# Patient Record
Sex: FEMALE
[De-identification: names, ages, dates, MRNs, and addresses within clinical notes are randomized per-mention and may not be internally consistent; named-entity substitution may affect disease eponyms.]

---

## 2018-04-20 ENCOUNTER — HOSPITAL ENCOUNTER (EMERGENCY)
Dept: HOSPITAL 97 - ER | Age: 53
Discharge: HOME | End: 2018-04-20
Payer: COMMERCIAL

## 2018-04-20 DIAGNOSIS — R06.00: Primary | ICD-10-CM

## 2018-04-20 DIAGNOSIS — Z88.1: ICD-10-CM

## 2018-04-20 LAB
ALBUMIN SERPL BCP-MCNC: 4.5 G/DL (ref 3.2–5.5)
ALP SERPL-CCNC: 79 IU/L (ref 42–121)
ALT SERPL W P-5'-P-CCNC: 40 IU/L (ref 10–60)
AST SERPL W P-5'-P-CCNC: 32 IU/L (ref 10–42)
BUN BLD-MCNC: 10 MG/DL (ref 6–20)
GLUCOSE SERPLBLD-MCNC: 88 MG/DL (ref 65–120)
HCT VFR BLD CALC: 41.6 % (ref 36–45)
INR BLD: 0.92
LYMPHOCYTES # SPEC AUTO: 1.6 K/UL (ref 0.7–4.9)
MAGNESIUM SERPL-MCNC: 1.9 MG/DL (ref 1.8–2.5)
MCH RBC QN AUTO: 29.7 PG (ref 27–35)
MCV RBC: 86 FL (ref 80–100)
PMV BLD: 7.7 FL (ref 7.6–11.3)
POTASSIUM SERPL-SCNC: 3.8 MEQ/L (ref 3.6–5)
RBC # BLD: 4.84 M/UL (ref 3.86–4.86)

## 2018-04-20 PROCEDURE — 71045 X-RAY EXAM CHEST 1 VIEW: CPT

## 2018-04-20 PROCEDURE — 99284 EMERGENCY DEPT VISIT MOD MDM: CPT

## 2018-04-20 PROCEDURE — 80048 BASIC METABOLIC PNL TOTAL CA: CPT

## 2018-04-20 PROCEDURE — 36415 COLL VENOUS BLD VENIPUNCTURE: CPT

## 2018-04-20 PROCEDURE — 80076 HEPATIC FUNCTION PANEL: CPT

## 2018-04-20 PROCEDURE — 85025 COMPLETE CBC W/AUTO DIFF WBC: CPT

## 2018-04-20 PROCEDURE — 83735 ASSAY OF MAGNESIUM: CPT

## 2018-04-20 PROCEDURE — 85610 PROTHROMBIN TIME: CPT

## 2018-04-20 PROCEDURE — 84484 ASSAY OF TROPONIN QUANT: CPT

## 2018-04-20 PROCEDURE — 83880 ASSAY OF NATRIURETIC PEPTIDE: CPT

## 2018-04-20 PROCEDURE — 93005 ELECTROCARDIOGRAM TRACING: CPT

## 2018-04-20 PROCEDURE — 85379 FIBRIN DEGRADATION QUANT: CPT

## 2018-04-20 NOTE — ER
Nurse's Notes                                                                                     

 [facility]                                                                

Name: Sid Coffey                                                                                

Age: 53 yrs                                                                                       

Sex: Female                                                                                       

: 1965                                                                                   

MRN: [id number]                                                                                   

Arrival Date: 2018                                                                          

Time: 09:04                                                                                       

Account#: [id number]                                                                            

Bed 4                                                                                             

Private MD:                                                                                       

Diagnosis: Dyspnea                                                                                

                                                                                                  

Presentation:                                                                                     

                                                                                             

09:24 Presenting complaint: Patient states: SOB at rest that started at 2 am this morning, pt sg  

      reports using her inhaler at home but it has not helped. pt denies CP, Dizziness,           

      Palpitations, denies N/V/D, denies fever. Transition of care: patient was not received      

      from another setting of care. Onset of symptoms was 2018. Initial Sepsis          

      Screen: Does the patient meet any 2 criteria? No. Patient's initial sepsis screen is        

      negative. Does the patient have a suspected source of infection? No. Patient's initial      

      sepsis screen is negative. Care prior to arrival: None.                                     

09:24 Method Of Arrival: Ambulatory                                                           sg  

09:24 Acuity: ZULEIKA 4                                                                           sg  

                                                                                                  

Triage Assessment:                                                                                

09:40 General: Appears in no apparent distress. comfortable, well groomed, well developed,    sg  

      well nourished, Behavior is calm, cooperative, appropriate for age. Respiratory: the        

      patient has mild shortness of breath.                                                       

                                                                                                  

Historical:                                                                                       

- Allergies:                                                                                      

09:24 Zofran;                                                                                 sg  

- PMHx:                                                                                           

09:24 Asthma; Hypertension;                                                                   sg  

- PSHx:                                                                                           

09:24 ;                                                                              sg  

                                                                                                  

- Immunization history:: Adult Immunizations unknown, Flu vaccine is not up to date.              

- Social history:: Smoking status: Patient/guardian denies using tobacco.                         

                                                                                                  

                                                                                                  

Screenin:35 Abuse screen: Denies threats or abuse. Denies injuries from another. Nutritional        sg  

      screening: No deficits noted. Tuberculosis screening: No symptoms or risk factors           

      identified. Never had TB. Fall Risk None identified.                                        

                                                                                                  

Assessment:                                                                                       

09:35 General: Appears in no apparent distress. comfortable, well groomed, well developed,    sg  

      well nourished, Behavior is calm, cooperative, appropriate for age. Pain: Denies pain.      

      Neuro: Level of Consciousness is awake, alert, obeys commands, Oriented to person,          

      place, time,  are equal bilaterally Moves all extremities. Full function Gait is       

      steady, Speech is normal, Facial symmetry appears normal. Cardiovascular: Heart tones       

      S1 S2 present Capillary refill is brisk in bilateral fingers Patient's skin is warm and     

      dry. Chest pain is denied. Respiratory: Reports shortness of breath at rest since 0200      

      this morning Airway is patent Respiratory effort is even, unlabored, Respiratory            

      pattern is regular, symmetrical, Breath sounds are clear. GI: Abdomen is round              

      non-distended. GI: No signs and/or symptoms were reported involving the                     

      gastrointestinal system. : No signs and/or symptoms were reported regarding the           

      genitourinary system. EENT: No signs and/or symptoms were reported regarding the EENT       

      system. Derm: Skin is intact, is healthy with good turgor, Skin is dry, Skin is normal,     

      Skin temperature is warm. Musculoskeletal: No signs and/or symptoms reported regarding      

      the musculoskeletal system.                                                                 

                                                                                                  

Vital Signs:                                                                                      

09:27  / 102; Pulse 78; Resp 20 S; Temp 98.0; Pulse Ox 100% on R/A; Weight 74.84 kg     sg  

      (R); Pain 0/10;                                                                             

09:34  / 84;                                                                            sg  

12:20  / 72; Pulse 77; Resp 17 S; Temp 98.0; Pulse Ox 100% on R/A; Pain 0/10;           sg  

09:27 pain with deep breathing                                                                sg  

                                                                                                  

ED Course:                                                                                        

09:04 Patient arrived in ED.                                                                  sb2 

09:18 Hema Kumari PA is PHCP.                                                               [id number] 

09:18 Faisal Avelar MD is Attending Physician.                                              [id number] 

09:23 Jeremy Fabian, RN is Primary Nurse.                                                       sg  

09:27 Triage completed.                                                                       sg  

09:28 Arm band placed on.                                                                     sg  

09:37 EKG done, by EKG tech. reviewed by Hema ANG.                                      at1 

09:40 Patient has correct armband on for positive identification. Bed in low position. Call   sg  

      light in reach. Pulse ox on. NIBP on. Warm blanket given. Head of bed elevated.             

11:46 X-ray completed. Portable x-ray completed in exam room. Patient tolerated procedure     ml  

      well.                                                                                       

11:49 XRAY Chest (1 view) In Process Unspecified.                                             EDMS

12:30 No provider procedures requiring assistance completed. IV discontinued, intact,         sg  

      bleeding controlled, No redness/swelling at site. Pressure dressing applied.                

                                                                                                  

Administered Medications:                                                                         

No medications were administered                                                                  

                                                                                                  

                                                                                                  

Outcome:                                                                                          

12:08 Discharge ordered by MD.                                                                [id number] 

12:30 Discharged to home ambulatory, with family.                                             sg  

12:30 Condition: good                                                                             

12:30 Discharge instructions given to patient, Instructed on discharge instructions, follow       

      up and referral plans. medication usage, safety practices, Demonstrated understanding       

      of instructions, follow-up care, medications, Prescriptions given X 3.                      

12:36 Patient left the ED.                                                                    sg  

                                                                                                  

Signatures:                                                                                       

Dispatcher [employer]                           EDJeremy Hamilton RN                         RN   Helen Hernandez Josh, PA PA   [id number]                                                  

Shahida moeller, EKG Tech              EKG Tat1                                                  

Magalie Olson                               sb2                                                  

                                                                                                  

**************************************************************************************************

## 2018-04-20 NOTE — EDPHYS
Physician Documentation                                                                           

 [facility]                                                                

Name: Sid Coffey                                                                                

Age: 53 yrs                                                                                       

Sex: Female                                                                                       

: 1965                                                                                   

MRN: [id number]                                                                                   

Arrival Date: 2018                                                                          

Time: 09:04                                                                                       

Account#: [id number]                                                                            

Bed 4                                                                                             

Private MD:                                                                                       

ED Physician Faisal Avelar                                                                       

HPI:                                                                                              

                                                                                             

12:07 This 53 yrs old  Female presents to ER via Ambulatory with complaints of        jr8 

      Shortness Of Breath.                                                                        

12:07 The patient has shortness of breath at rest. Onset: The symptoms/episode began/occurred jr8 

      acutely, today. Duration: The symptoms are continuous, and are unchanged since they         

      started. The patient's shortness of breath is aggravated by exertion. Associated signs      

      and symptoms: The patient has no apparent associated signs or symptoms. Severity of         

      symptoms: At their worst the symptoms were mild in the emergency department the             

      symptoms are unchanged. It is unknown whether or not the patient has had similar            

      symptoms in the past. The patient has not recently seen a physician.                        

                                                                                                  

Historical:                                                                                       

- Allergies:                                                                                      

09:24 Zofran;                                                                                 sg  

- PMHx:                                                                                           

09:24 Asthma; Hypertension;                                                                   sg  

- PSHx:                                                                                           

09:24 ;                                                                              sg  

                                                                                                  

- Immunization history:: Adult Immunizations unknown, Flu vaccine is not up to date.              

- Social history:: Smoking status: Patient/guardian denies using tobacco.                         

                                                                                                  

                                                                                                  

ROS:                                                                                              

12:07 Eyes: Negative for injury, pain, redness, and discharge, ENT: Negative for injury,      jr8 

      pain, and discharge, Neck: Negative for injury, pain, and swelling, Cardiovascular:         

      Negative for chest pain, palpitations, and edema, Abdomen/GI: Negative for abdominal        

      pain, nausea, vomiting, diarrhea, and constipation, Back: Negative for injury and pain,     

      MS/Extremity: Negative for injury and deformity, Skin: Negative for injury, rash, and       

      discoloration, Neuro: Negative for headache, weakness, numbness, tingling, and seizure.     

12:07 Respiratory: Positive for dyspnea on exertion, shortness of breath, Negative for cough,     

      hemoptysis, orthopnea, pleurisy, wheezing.                                                  

                                                                                                  

Exam:                                                                                             

12:07 Eyes:  Pupils equal round and reactive to light, extra-ocular motions intact.  Lids and jr8 

      lashes normal.  Conjunctiva and sclera are non-icteric and not injected.  Cornea within     

      normal limits.  Periorbital areas with no swelling, redness, or edema. ENT:  Nares          

      patent. No nasal discharge, no septal abnormalities noted.  Tympanic membranes are          

      normal and external auditory canals are clear.  Oropharynx with no redness, swelling,       

      or masses, exudates, or evidence of obstruction, uvula midline.  Mucous membranes           

      moist. Neck:  Trachea midline, no thyromegaly or masses palpated, and no cervical           

      lymphadenopathy.  Supple, full range of motion without nuchal rigidity, or vertebral        

      point tenderness.  No Meningismus. Cardiovascular:  Regular rate and rhythm with a          

      normal S1 and S2.  No gallops, murmurs, or rubs.  Normal PMI, no JVD.  No pulse             

      deficits. Respiratory:  Lungs have equal breath sounds bilaterally, clear to                

      auscultation and percussion.  No rales, rhonchi or wheezes noted.  No increased work of     

      breathing, no retractions or nasal flaring. Abdomen/GI:  Soft, non-tender, with normal      

      bowel sounds.  No distension or tympany.  No guarding or rebound.  No evidence of           

      tenderness throughout. Back:  No spinal tenderness.  No costovertebral tenderness.          

      Full range of motion. Skin:  Warm, dry with normal turgor.  Normal color with no            

      rashes, no lesions, and no evidence of cellulitis. MS/ Extremity:  Pulses equal, no         

      cyanosis.  Neurovascular intact.  Full, normal range of motion. Neuro:  Awake and           

      alert, GCS 15, oriented to person, place, time, and situation.  Cranial nerves II-XII       

      grossly intact.  Motor strength 5/5 in all extremities.  Sensory grossly intact.            

      Cerebellar exam normal.  Normal gait.                                                       

                                                                                                  

Vital Signs:                                                                                      

09:27  / 102; Pulse 78; Resp 20 S; Temp 98.0; Pulse Ox 100% on R/A; Weight 74.84 kg     sg  

      (R); Pain 0/10;                                                                             

09:34  / 84;                                                                            sg  

12:20  / 72; Pulse 77; Resp 17 S; Temp 98.0; Pulse Ox 100% on R/A; Pain 0/10;           sg  

09:27 pain with deep breathing                                                                sg  

                                                                                                  

MDM:                                                                                              

09:18 Patient medically screened.                                                             jr8 

12:07 Differential diagnosis: Anxiety Reaction asthma, Bronchitis CHF exacerbation, Chronic   jr8 

      Obstructive Pulmonary Disease Myocardial Infarction pneumonia, pulmonary edema,             

      Pulmonary Embolism Sepsis Unstable Angina. Data reviewed: vital signs, nurses notes,        

      lab test result(s), EKG, radiologic studies, plain films, and as a result, I will           

      discharge patient. Data interpreted: Pulse oximetry: on room air is 100 %.                  

      Interpretation: normal. Counseling: I had a detailed discussion with the patient and/or     

      guardian regarding: the historical points, exam findings, and any diagnostic results        

      supporting the discharge/admit diagnosis, lab results, radiology results, the need for      

      outpatient follow up, a family practitioner, to return to the emergency department if       

      symptoms worsen or persist or if there are any questions or concerns that arise at home.    

                                                                                                  

                                                                                             

10:23 Order name: Basic Metabolic Panel; Complete Time: 11:                                                                                             

10:23 Order name: BNP; Complete Time: :                                                                                             

10:23 Order name: CBC with Diff; Complete Time: 11:                                                                                             

10:23 Order name: LFT's; Complete Time: :                                                                                             

10:23 Order name: Magnesium; Complete Time: :                                                                                             

10:23 Order name: PT-INR; Complete Time: 11:                                                                                             

10:23 Order name: Troponin (emerg Dept Use Only); Complete Time: :                                                                                             

10:23 Order name: XRAY Chest (1 view); Complete Time: 12:07                                                                                                                                

10:23 Order name: Cardiac monitoring; Complete Time: 11:                                                                                             

10:23 Order name: EKG - Nurse/Tech; Complete Time: 11:                                                                                             

10:23 Order name: IV Saline Lock; Complete Time: 11:                                                                                             

10:23 Order name: DD; Complete Time: 11:                                                                                             

11:56 Order name: EKG Electrocardiogram                                                       EDMS

                                                                                             

10:23 Order name: Labs collected and sent; Complete Time: :                                                                                             

10:23 Order name: O2 Per Protocol; Complete Time: :                                                                                             

10:23 Order name: O2 Sat Monitoring; Complete Time: : 

                                                                                                  

Administered Medications:                                                                         

No medications were administered                                                                  

                                                                                                  

                                                                                                  

Disposition:                                                                                      

18 12:08 Discharged to Home. Impression: Dyspnea.                                           

- Condition is Stable.                                                                            

- Discharge Instructions: Panic Attacks, Shortness of Breath.                                     

- Prescriptions for Hydroxyzine HCl 50 mg Oral Tablet - take 1 tablet by ORAL route               

  every 8 hours As needed; 21 tablet.                                                             

- Work release form, Medication Reconciliation Form, Thank You Letter, Antibiotic                 

  Education, Prescription Opioid Use form.                                                        

- Follow up: Private Physician; When: 2 - 3 days; Reason: Recheck today's complaints,             

  Continuance of care, Re-evaluation by your physician.                                           

- Problem is new.                                                                                 

- Symptoms have improved.                                                                         

                                                                                                  

                                                                                                  

                                                                                                  

Addendum:                                                                                         

2018                                                                                        

     19:43 Co-signature as Attending Physician, Faisal Avelar MD I agree with the assessment and   k
dr

           plan of care.                                                                          

                                                                                                  

Signatures:                                                                                       

Dispatcher [employer]                           EDMS                                                 

Jeremy Fabian RN                         RN   sg                                                   

Faisal Avelar MD MD   [facility]                                                  

Hema Kumari PA                        PA   jr8                                                  

                                                                                                  

Corrections: (The following items were deleted from the chart)                                    

                                                                                             

12:36 12:08 2018 12:08 Discharged to Home. Impression: Dyspnea. Condition is Stable.    sg  

      Forms are Medication Reconciliation Form, Thank You Letter, Antibiotic Education,           

      Prescription Opioid Use. Follow up: Private Physician; When: 2 - 3 days; Reason:            

      Recheck today's complaints, Continuance of care, Re-evaluation by your physician.           

      Problem is new. Symptoms have improved. jr8                                                 

                                                                                                  

**************************************************************************************************

## 2018-04-20 NOTE — EKG
Test Date:    2018-04-20               Test Time:    09:24:28

Technician:   RAJENDRA                                     

                                                     

MEASUREMENT RESULTS:                                       

Intervals:                                           

Rate:         75                                     

LA:           154                                    

QRSD:         76                                     

QT:           388                                    

QTc:          433                                    

Axis:                                                

P:            62                                     

LA:           154                                    

QRS:          57                                     

T:            9                                      

                                                     

INTERPRETIVE STATEMENTS:                                       

                                                     

Normal sinus rhythm

Low voltage QRS

Borderline ECG

Compared to ECG 08/18/2017 07:52:58

Low QRS voltage now present

Myocardial infarct finding no longer present



Electronically Signed On 04-20-18 12:55:18 CDT by Benjamin Chacko

## 2018-04-20 NOTE — RAD REPORT
EXAM DESCRIPTION:  RAD - Chest Single View - 4/20/2018 11:55 am

 

CLINICAL HISTORY:  Shortness of breath.

 

COMPARISON:  None.

 

FINDINGS:  Portable technique limits examination quality.

 

The lungs are grossly clear. The heart is normal in size. No displaced fractures.

 

IMPRESSION:  No acute intrathoracic process suspected.

## 2018-11-03 ENCOUNTER — HOSPITAL ENCOUNTER (EMERGENCY)
Dept: HOSPITAL 97 - ER | Age: 53
Discharge: HOME | End: 2018-11-03
Payer: COMMERCIAL

## 2018-11-03 DIAGNOSIS — Z88.8: ICD-10-CM

## 2018-11-03 DIAGNOSIS — R51: Primary | ICD-10-CM

## 2018-11-03 DIAGNOSIS — I10: ICD-10-CM

## 2018-11-03 LAB
BUN BLD-MCNC: 12 MG/DL (ref 7–18)
GLUCOSE SERPLBLD-MCNC: 114 MG/DL (ref 74–106)
HCT VFR BLD CALC: 41.8 % (ref 36–45)
LYMPHOCYTES # SPEC AUTO: 1.1 K/UL (ref 0.7–4.9)
MCH RBC QN AUTO: 30.8 PG (ref 27–35)
MCV RBC: 88.1 FL (ref 80–100)
PMV BLD: 7.8 FL (ref 7.6–11.3)
POTASSIUM SERPL-SCNC: 3.9 MMOL/L (ref 3.5–5.1)
RBC # BLD: 4.74 M/UL (ref 3.86–4.86)

## 2018-11-03 PROCEDURE — 85025 COMPLETE CBC W/AUTO DIFF WBC: CPT

## 2018-11-03 PROCEDURE — 36415 COLL VENOUS BLD VENIPUNCTURE: CPT

## 2018-11-03 PROCEDURE — 81003 URINALYSIS AUTO W/O SCOPE: CPT

## 2018-11-03 PROCEDURE — 80048 BASIC METABOLIC PNL TOTAL CA: CPT

## 2018-11-03 PROCEDURE — 99284 EMERGENCY DEPT VISIT MOD MDM: CPT

## 2018-11-03 PROCEDURE — 81025 URINE PREGNANCY TEST: CPT

## 2018-11-03 NOTE — ER
Nurse's Notes                                                                                     

 [facility]                                                                

Name: Sid Coffey                                                                                

Age: 53 yrs                                                                                       

Sex: Female                                                                                       

: 1965                                                                                   

MRN: [id number]                                                                                   

Arrival Date: 2018                                                                          

Time: 12:19                                                                                       

Account#: [id number]                                                                            

Bed 4                                                                                             

Private MD:                                                                                       

Diagnosis: Headache                                                                               

                                                                                                  

Presentation:                                                                                     

                                                                                             

12:24 Presenting complaint: Patient states: Headache since 0300 with vomiting, similar to     la1 

      previous, cannot keep blood pressure medication down. Transition of care: patient was       

      not received from another setting of care. Onset of symptoms was 2018.         

      Risk Assessment: Do you want to hurt yourself or someone else? Patient reports no           

      desire to harm self or others. Initial Sepsis Screen: Does the patient meet any 2           

      criteria? No. Patient's initial sepsis screen is negative. Does the patient have a          

      suspected source of infection? No. Patient's initial sepsis screen is negative. Care        

      prior to arrival: None.                                                                     

12:24 Method Of Arrival: Ambulatory                                                           la1 

12:24 Acuity: ZULEIKA 3                                                                           la1 

                                                                                                  

Triage Assessment:                                                                                

12:30 Headache History: The patient has had previous headaches and this one is similar to     tw2 

      previous episodes.                                                                          

12:39 General: Appears in no apparent distress. Pain: Pain currently is 10 out of 10 on a     tw2 

      pain scale. Pain began suddenly, "at 3 am, I tried advil and i wasn't able to keep it       

      down" Also complains of nausea.                                                             

                                                                                                  

OB/GYN:                                                                                           

13:30 LMP N/A - Post-menopause                                                                tw2 

                                                                                                  

Historical:                                                                                       

- Allergies:                                                                                      

12:26 Zofran;                                                                                 la1 

- PMHx:                                                                                           

12:26 Asthma; Hypertension;                                                                   la1 

                                                                                                  

- Immunization history:: Adult Immunizations up to date.                                          

- Social history:: Smoking status: Patient/guardian denies using tobacco.                         

- Ebola Screening: : No symptoms or risks identified at this time.                                

                                                                                                  

                                                                                                  

Screenin:38 Abuse screen: Denies threats or abuse. Nutritional screening: No deficits noted.        tw2 

      Tuberculosis screening: No symptoms or risk factors identified. Fall Risk None              

      identified.                                                                                 

                                                                                                  

Assessment:                                                                                       

12:37 General: Appears in no apparent distress. Behavior is calm, cooperative, appropriate    tw2 

      for age. Pain: Complains of pain in Headache. Neuro: Level of Consciousness is awake,       

      alert, obeys commands, Oriented to person, place, time, situation. Cardiovascular:          

      Denies chest pain, shortness of breath, Heart tones S1 S2 Capillary refill < 3 seconds      

      Patient's skin is warm and dry. Respiratory: Airway is patent Respiratory effort is         

      even, unlabored, Respiratory pattern is regular, symmetrical. GI: Abdomen is flat,          

      Bowel sounds present X 4 quads. Reports nausea, Patient currently denies abdominal          

      pain. : No signs and/or symptoms were reported regarding the genitourinary system.        

      EENT: No signs and/or symptoms were reported regarding the EENT system. Derm: No signs      

      and/or symptoms reported regarding the dermatologic system. Musculoskeletal: Range of       

      motion: intact in all extremities.                                                          

13:05 Reassessment: Patient and/or family updated on plan of care and expected duration. Pain tw2 

      level reassessed. pt reports feeling anxious "and agitated and my legs are jumping",        

      provider SAV Cabrera notified and at bedside at this time Patient states symptoms have      

      not improved.                                                                               

13:25 Reassessment: Patient appears in no apparent distress at this time. Patient and/or      tw2 

      family updated on plan of care and expected duration. Pain level reassessed. Patient        

      states feeling better.                                                                      

14:33 Reassessment: Patient appears in no apparent distress at this time. Patient and/or      tw2 

      family updated on plan of care and expected duration. Pain level reassessed. Patient        

      states feeling better. Patient states symptoms have improved.                               

15:34 Reassessment: Patient appears in no apparent distress at this time. Patient and/or      tw2 

      family updated on plan of care and expected duration. Pain level reassessed.                

                                                                                                  

Vital Signs:                                                                                      

12:25  / 93; Pulse 86; Resp 16; Temp 97.6; Pulse Ox 98% on R/A; Weight 75.75 kg; Height la1 

      5 ft. 7 in. (170.18 cm);                                                                    

13:30  / 47; Pulse 75; Resp 17; Pulse Ox 97% on R/A; Pain 7/10;                         tw2 

14:33  / 66; Pulse 78; Resp 17; Pulse Ox 97% on R/A;                                    tw2 

15:33 BP 92 / 65; Pulse 53; Resp 17; Pulse Ox 97% on R/A; Pain 4/10;                          tw2 

12:25 Body Mass Index 26.16 (75.75 kg, 170.18 cm)                                             la1 

                                                                                                  

ED Course:                                                                                        

12:19 Patient arrived in ED.                                                                  as  

12:25 Triage completed.                                                                       la1 

12:26 Arm band placed on right wrist.                                                         la1 

12:29 Daisha Kessler RN is Primary Nurse.                                                        tw2 

12:33 Robin Nicholson PA is PHCP.                                                                cp  

12:33 Faisal Avelar MD is Attending Physician.                                              cp  

12:37 Inserted saline lock: 22 gauge in right antecubital area, using aseptic technique.      tw2 

      Blood collected.                                                                            

12:39 Bed in low position. Call light in reach. Pulse ox on. NIBP on.                         tw2 

14:58 Awaiting transportation, Awaiting: and completion of IV fluids PRIOR to discharge, pt   tw2 

      is still drowsy at this time, pt is calling daughter to come get her.                       

15:34 Awaiting transportation, Awaiting: at this time, VALENTIN Still charge nurse notified.      tw2 

15:51 No provider procedures requiring assistance completed. IV discontinued, intact,         tw2 

      bleeding controlled, No redness/swelling at site. Pressure dressing applied.                

                                                                                                  

Administered Medications:                                                                         

12:52 Drug: Phenergan 25 mg Route: IVP; Site: right antecubital;                              tw2 

13:25 Follow up: Response: No adverse reaction; Nausea is decreased                           tw2 

12:55 Drug: NS 0.9% 1000 ml Route: IV; Rate: 1 bolus; Site: left antecubital;                 tw2 

15:51 Follow up: Response: No adverse reaction; IV Status: Completed infusion; IV Intake:     tw2 

      1000ml                                                                                      

12:55 Drug: Reglan 20 mg Route: IVP; Site: right antecubital;                                 tw2 

13:05 Follow up: Response: Adverse reaction, Physician notified; Adverse reaction, Physician  tw2 

      notified, medication stopped, pt became restless and agitated                               

13:45 Drug: Decadron - Dexamethasone 10 mg Route: IVP; Site: left antecubital;                tw2 

14:58 Follow up: Response: No adverse reaction                                                tw2 

                                                                                                  

                                                                                                  

Intake:                                                                                           

15:51 IV: 1000ml; Total: 1000ml.                                                              tw2 

                                                                                                  

Outcome:                                                                                          

14:51 Discharge ordered by MD.                                                                cp  

15:51 Discharged to home ambulatory, with family.                                             tw2 

15:51 Condition: stable                                                                           

15:51 Discharge instructions given to patient, family, Instructed on discharge instructions,      

      follow up and referral plans. no drinking with medication, no driving heavy equipment,      

      medication usage, Demonstrated understanding of instructions, follow-up care,               

      medications, Prescriptions given X 2.                                                       

15:52 Patient left the ED.                                                                    tw2 

                                                                                                  

Signatures:                                                                                       

Karen Orozco Lee, RN                         RN   la1                                                  

Robin Nicholson PA PA cp Wise, Tara RN                          RN   tw2                                                  

                                                                                                  

**************************************************************************************************

## 2018-11-03 NOTE — EDPHYS
Physician Documentation                                                                           

 [facility]                                                                

Name: Sid Coffey                                                                                

Age: 53 yrs                                                                                       

Sex: Female                                                                                       

: 1965                                                                                   

MRN: [id number]                                                                                   

Arrival Date: 2018                                                                          

Time: 12:19                                                                                       

Account#: [id number]                                                                            

Bed 4                                                                                             

Private MD:                                                                                       

ED Physician Faisal Avelar                                                                       

HPI:                                                                                              

                                                                                             

12:50 This 53 yrs old  Female presents to ER via Ambulatory with complaints of        cp  

      Headache, Vomiting.                                                                         

12:50 The patient complains of pain to the right side of head.                                cp  

12:50 The patient describes the headache as aching, constant.                                 cp  

12:50 Onset: The symptoms/episode began/occurred this morning. Associated signs and symptoms: cp  

      Pertinent positives: nausea, vomiting, Pertinent negatives: fever, neck stiffness,          

      sinus congestion, vision changes. Severity of symptoms: in the emergency department the     

      pain is unchanged, despite home interventions. Headache History: The patient has had        

      previous headaches and this one is similar to previous episodes.                            

                                                                                                  

OB/GYN:                                                                                           

13:30 LMP N/A - Post-menopause                                                                tw2 

                                                                                                  

Historical:                                                                                       

- Allergies:                                                                                      

12:26 Zofran;                                                                                 la1 

- PMHx:                                                                                           

12:26 Asthma; Hypertension;                                                                   la1 

                                                                                                  

- Immunization history:: Adult Immunizations up to date.                                          

- Social history:: Smoking status: Patient/guardian denies using tobacco.                         

- Ebola Screening: : No symptoms or risks identified at this time.                                

                                                                                                  

                                                                                                  

ROS:                                                                                              

12:55 Constitutional: Negative for body aches, chills, fever, poor PO intake.                 cp  

12:55 Eyes: Negative for injury, pain, redness, and discharge.                                cp  

12:55 ENT: Negative for drainage from ear(s), ear pain, sore throat, difficulty swallowing,       

      difficulty handling secretions.                                                             

12:55 Cardiovascular: Negative for chest pain, palpitations.                                      

12:55 Respiratory: Negative for cough, shortness of breath, wheezing.                             

12:55 Abdomen/GI: Positive for nausea, vomiting, Negative for abdominal pain, diarrhea,           

      constipation.                                                                               

12:55 : Negative for urinary symptoms.                                                          

12:55 Skin: Negative for cellulitis, rash.                                                        

12:55 Neuro: Positive for headache, Negative for altered mental status, seizure activity,         

      weakness.                                                                                   

12:55 All other systems are negative.                                                             

                                                                                                  

Exam:                                                                                             

13:00 Constitutional: The patient appears in no acute distress, alert, awake, non-toxic, well cp  

      developed, well nourished, uncomfortable.                                                   

13:00 Head/Face:  Normocephalic, atraumatic. Eyes:  Pupils equal round and reactive to light, cp  

      extra-ocular motions intact.  Lids and lashes normal.  Conjunctiva and sclera are           

      non-icteric and not injected.  Cornea within normal limits.  Periorbital areas with no      

      swelling, redness, or edema. ENT:  Nares patent. No nasal discharge, no septal              

      abnormalities noted.  Tympanic membranes are normal and external auditory canals are        

      clear.  Oropharynx with no redness, swelling, or masses, exudates, or evidence of           

      obstruction, uvula midline.  Mucous membranes moist. Neck:  Trachea midline, no             

      thyromegaly or masses palpated, and no cervical lymphadenopathy.  Supple, full range of     

      motion without nuchal rigidity, or vertebral point tenderness.  No Meningismus.             

      Chest/axilla:  Normal chest wall appearance and motion.  Nontender with no deformity.       

      No lesions are appreciated.                                                                 

13:00 Cardiovascular: Rate: normal, Rhythm: regular, Edema: is not appreciated, JVD: is not       

      appreciated.                                                                                

13:00 Respiratory: the patient does not display signs of respiratory distress,  Respirations:     

      normal, no use of accessory muscles, no retractions, no splinting, no tachypnea,            

      labored breathing, is not present, Breath sounds: are clear throughout, no decreased        

      breath sounds, no stridor, no wheezing.                                                     

13:00 Abdomen/GI: Inspection: abdomen appears normal, Bowel sounds: active, all quadrants,        

      Palpation: abdomen is soft and non-tender, in all quadrants.                                

13:00 Back: pain, is absent, ROM is normal.                                                       

13:00 Skin: cellulitis, is not appreciated, no rash present.                                      

13:00 Neuro: Orientation: to person, place \T\ time. Mentation: is normal, Cerebellar function:   

      is grossly normal, Motor: moves all fours, strength is normal, Sensation: is normal,        

      Gait: is steady, at a normal pace, without difficulty.                                      

                                                                                                  

Vital Signs:                                                                                      

12:25  / 93; Pulse 86; Resp 16; Temp 97.6; Pulse Ox 98% on R/A; Weight 75.75 kg; Height la1 

      5 ft. 7 in. (170.18 cm);                                                                    

13:30  / 47; Pulse 75; Resp 17; Pulse Ox 97% on R/A; Pain 7/10;                         tw2 

14:33  / 66; Pulse 78; Resp 17; Pulse Ox 97% on R/A;                                    tw2 

15:33 BP 92 / 65; Pulse 53; Resp 17; Pulse Ox 97% on R/A; Pain 4/10;                          tw2 

12:25 Body Mass Index 26.16 (75.75 kg, 170.18 cm)                                             la1 

                                                                                                  

MDM:                                                                                              

12:33 Patient medically screened.                                                             cp  

14:50 Data reviewed: vital signs, nurses notes, lab test result(s), and as a result, I will   cp  

      discharge patient.                                                                          

14:50 Counseling: I had a detailed discussion with the patient and/or guardian regarding: the cp  

      historical points, exam findings, and any diagnostic results supporting the                 

      discharge/admit diagnosis, lab results, the need for outpatient follow up, a family         

      practitioner, to return to the emergency department if symptoms worsen or persist or if     

      there are any questions or concerns that arise at home. Response to treatment: the          

      patient's symptoms have markedly improved after treatment, VSS. Headache markedly           

      improved and patient observed sleeping in exam room.                                        

                                                                                                  

                                                                                             

12:45 Order name: CBC with Diff; Complete Time: 13:13                                         cp  

                                                                                             

12:45 Order name: BMP; Complete Time: 13:13                                                   cp  

                                                                                             

13:22 Order name: Urine Dipstick--Ancillary (enter results); Complete Time: 14:01             em1 

                                                                                             

13:22 Order name: Urine Pregnancy--Ancillary (enter results); Complete Time: 14:01            em1 

                                                                                             

12:45 Order name: IV; Complete Time: 12:46                                                    cp  

                                                                                             

12:45 Order name: Urine Dipstick-Ancillary (obtain specimen); Complete Time: 13:15            cp  

                                                                                             

12:45 Order name: Urine Pregnancy Test (obtain specimen); Complete Time: 13:15                cp  

                                                                                                  

Administered Medications:                                                                         

12:52 Drug: Phenergan 25 mg Route: IVP; Site: right antecubital;                              tw2 

13:25 Follow up: Response: No adverse reaction; Nausea is decreased                           tw2 

12:55 Drug: NS 0.9% 1000 ml Route: IV; Rate: 1 bolus; Site: left antecubital;                 tw2 

15:51 Follow up: Response: No adverse reaction; IV Status: Completed infusion; IV Intake:     tw2 

      1000ml                                                                                      

12:55 Drug: Reglan 20 mg Route: IVP; Site: right antecubital;                                 tw2 

13:05 Follow up: Response: Adverse reaction, Physician notified; Adverse reaction, Physician  tw2 

      notified, medication stopped, pt became restless and agitated                               

13:45 Drug: Decadron - Dexamethasone 10 mg Route: IVP; Site: left antecubital;                tw2 

14:58 Follow up: Response: No adverse reaction                                                tw2 

                                                                                                  

                                                                                                  

Disposition:                                                                                      

16:51 Co-signature as Attending Physician, Faisal Avelar MD I agree with the assessment and   kdr 

      plan of care.                                                                               

                                                                                                  

Disposition:                                                                                      

18 14:51 Discharged to Home. Impression: Headache.                                          

- Condition is Stable.                                                                            

- Discharge Instructions: Migraine Headache.                                                      

- Prescriptions for Fiorinal 50- 325-40 mg Oral Capsule - take 1 capsule by ORAL route            

  every 4 hours As needed - not to exceed 6 capsules per day; 20 capsule. promethazine            

  25 mg Oral Tablet - take 1 tablet by ORAL route every 6 hours As needed; 20 tablet.             

- Work release form, Medication Reconciliation Form, Thank You Letter, Antibiotic                 

  Education, Prescription Opioid Use form.                                                        

- Follow up: Private Physician; When: 2 - 3 days; Reason: Recheck today's complaints.             

- Problem is new.                                                                                 

- Symptoms have improved.                                                                         

                                                                                                  

                                                                                                  

                                                                                                  

Signatures:                                                                                       

Dispatcher [employer]                           EDMS                                                 

Faisal Avelar MD MD   kdr                                                  

Conrad Murcia RN                         RN   la1                                                  

Robin Nicholson PA PA   cp                                                   

Daisha Kessler RN                          RN   tw2                                                  

                                                                                                  

Corrections: (The following items were deleted from the chart)                                    

15:52 14:51 2018 14:51 Discharged to Home. Impression: Headache. Condition is Stable.   tw2 

      Forms are Medication Reconciliation Form, Thank You Letter, Antibiotic Education,           

      Prescription Opioid Use. Follow up: Private Physician; When: 2 - 3 days; Reason:            

      Recheck today's complaints. Problem is new. Symptoms have improved. cp                      

                                                                                                  

**************************************************************************************************

## 2021-05-31 ENCOUNTER — HOSPITAL ENCOUNTER (EMERGENCY)
Dept: HOSPITAL 97 - ER | Age: 56
Discharge: HOME | End: 2021-05-31
Payer: COMMERCIAL

## 2021-05-31 DIAGNOSIS — I10: ICD-10-CM

## 2021-05-31 DIAGNOSIS — Z20.822: ICD-10-CM

## 2021-05-31 DIAGNOSIS — A08.39: Primary | ICD-10-CM

## 2021-05-31 DIAGNOSIS — Z88.8: ICD-10-CM

## 2021-05-31 LAB
ALBUMIN SERPL BCP-MCNC: 4.3 G/DL (ref 3.4–5)
ALP SERPL-CCNC: 86 U/L (ref 45–117)
ALT SERPL W P-5'-P-CCNC: 42 U/L (ref 12–78)
AST SERPL W P-5'-P-CCNC: 31 U/L (ref 15–37)
BUN BLD-MCNC: 14 MG/DL (ref 7–18)
GLUCOSE SERPLBLD-MCNC: 132 MG/DL (ref 74–106)
HCT VFR BLD CALC: 39.8 % (ref 36–45)
LIPASE SERPL-CCNC: 78 U/L (ref 73–393)
LYMPHOCYTES # SPEC AUTO: 1.3 K/UL (ref 0.7–4.9)
PMV BLD: 7.6 FL (ref 7.6–11.3)
POTASSIUM SERPL-SCNC: 4.5 MMOL/L (ref 3.5–5.1)
RBC # BLD: 4.49 M/UL (ref 3.86–4.86)

## 2021-05-31 PROCEDURE — 85025 COMPLETE CBC W/AUTO DIFF WBC: CPT

## 2021-05-31 PROCEDURE — 96374 THER/PROPH/DIAG INJ IV PUSH: CPT

## 2021-05-31 PROCEDURE — 81003 URINALYSIS AUTO W/O SCOPE: CPT

## 2021-05-31 PROCEDURE — 96361 HYDRATE IV INFUSION ADD-ON: CPT

## 2021-05-31 PROCEDURE — 80048 BASIC METABOLIC PNL TOTAL CA: CPT

## 2021-05-31 PROCEDURE — 96375 TX/PRO/DX INJ NEW DRUG ADDON: CPT

## 2021-05-31 PROCEDURE — 99283 EMERGENCY DEPT VISIT LOW MDM: CPT

## 2021-05-31 PROCEDURE — 36415 COLL VENOUS BLD VENIPUNCTURE: CPT

## 2021-05-31 PROCEDURE — 70450 CT HEAD/BRAIN W/O DYE: CPT

## 2021-05-31 PROCEDURE — 83690 ASSAY OF LIPASE: CPT

## 2021-05-31 PROCEDURE — 80076 HEPATIC FUNCTION PANEL: CPT

## 2021-05-31 NOTE — EDPHYS
Physician Documentation                                                                           

 [facility]                                                                 

Name: Sid Coffey                                                                                

Age: 56 yrs                                                                                       

Sex: Female                                                                                       

: 1965                                                                                   

MRN: [id number]                                                                                   

Arrival Date: 2021                                                                          

Time: 18:31                                                                                       

Account#: [id number]                                                                            

Bed 16                                                                                            

Private MD:                                                                                       

ED Physician Caio Aguirre                                                                     

HPI:                                                                                              

                                                                                             

23:29 This 56 yrs old  Female presents to ER via Ambulatory with complaints of        tw4 

      Headache, Fever, Vomiting.                                                                  

23:29 The patient complains of pain to the forehead. The patient describes the headache as    tw4 

      aching. Onset: The symptoms/episode began/occurred today. Associated signs and              

      symptoms: The patient has no apparent associated signs or symptoms. Severity of             

      symptoms: At its worst the pain was moderate, in the emergency department the pain is       

      unchanged. The symptoms are alleviated by nothing. the symptoms are aggravated by           

      nothing. The patient has not experienced similar symptoms in the past.                      

                                                                                                  

Historical:                                                                                       

- Allergies:                                                                                      

18:49 Zofran;                                                                                 ll1 

- PMHx:                                                                                           

18:49 Asthma; Hypertension;                                                                   ll1 

- PSHx:                                                                                           

18:49 ; Cholecystectomy;                                                             ll1 

                                                                                                  

- Immunization history:: Flu vaccine is up to date.                                               

- Social history:: Smoking status: Patient denies any tobacco usage or history of.                

                                                                                                  

                                                                                                  

ROS:                                                                                              

23:29 Eyes: Negative for injury, pain, redness, and discharge, Cardiovascular: Negative for   tw4 

      chest pain, palpitations, and edema, Respiratory: Negative for shortness of breath,         

      cough, wheezing, and pleuritic chest pain, Abdomen/GI: Negative for abdominal pain,         

      nausea, vomiting, diarrhea, and constipation.                                               

23:29 Constitutional: Positive for fever, Negative for body aches, chills, fatigue, malaise,      

      poor PO intake, weight loss.                                                                

23:29 Neuro: Positive for headache, Negative for altered mental status, dizziness, gait           

      disturbance, hearing loss, loss of consciousness, numbness, seizure activity, speech        

      changes, tinnitus, tremor, visual changes.                                                  

                                                                                                  

Exam:                                                                                             

23:29 Constitutional:  This is a well developed, well nourished patient who is awake, alert,  tw4 

      and in no acute distress. Head/Face:  Normocephalic, atraumatic. Chest/axilla:  Normal      

      chest wall appearance and motion.  Nontender with no deformity.  No lesions are             

      appreciated. Cardiovascular:  Regular rate and rhythm with a normal S1 and S2.  No          

      gallops, murmurs, or rubs.  Normal PMI, no JVD.  No pulse deficits. Respiratory:  Lungs     

      have equal breath sounds bilaterally, clear to auscultation and percussion.  No rales,      

      rhonchi or wheezes noted.  No increased work of breathing, no retractions or nasal          

      flaring. Abdomen/GI:  Soft, non-tender, with normal bowel sounds.  No distension or         

      tympany.  No guarding or rebound.  No evidence of tenderness throughout. Back:  No          

      spinal tenderness.  No costovertebral tenderness.  Full range of motion. Skin:  Warm,       

      dry with normal turgor.  Normal color with no rashes, no lesions, and no evidence of        

      cellulitis. MS/ Extremity:  Pulses equal, no cyanosis.  Neurovascular intact.  Full,        

      normal range of motion. Neuro:  Awake and alert, GCS 15, oriented to person, place,         

      time, and situation.  Cranial nerves II-XII grossly intact.  Motor strength 5/5 in all      

      extremities.  Sensory grossly intact.  Cerebellar exam normal.  Normal gait.                

                                                                                                  

Vital Signs:                                                                                      

18:49  / 89; Pulse 98; Resp 18; Temp 100.8; Pulse Ox 95% ; Weight 79.38 kg; Height 5    ll1 

      ft. 2 in. (157.48 cm); Pain 10/10;                                                          

21:46  / 108; Pulse 87; Resp 16; Temp 98.8; Pulse Ox 96% on R/A;                        jm8 

23:36  / 89; Pulse 84; Resp 16; Pulse Ox 99% on R/A;                                    jm8 

18:49 Body Mass Index 32.01 (79.38 kg, 157.48 cm)                                             ll1 

                                                                                                  

[place] Coma Score:                                                                               

23:31 Eye Response: spontaneous(4). Verbal Response: oriented(5). Motor Response: obeys       tw4 

      commands(6). Total: 15.                                                                     

                                                                                                  

MDM:                                                                                              

19:43 Patient medically screened.                                                              

23:31 Data reviewed: vital signs, nurses notes.                                                                                                                                            

19:45 Order name: Basic Metabolic Panel; Complete Time: 20:41                                                                                                                              

20:41 Interpretation: Normal except: GLUC 132.                                                                                                                                             

19:45 Order name: CBC with Diff; Complete Time: 20:41                                                                                                                                      

20:42 Interpretation: Normal except: LYM% 13.6; FALLON% 82.5.                                    tw4 

                                                                                             

19:45 Order name: Hepatic Function; Complete Time: 20:41                                      [facility] 

                                                                                             

20:42 Interpretation: Normal except: BILIT 1.1; BILID 0.3; GLOB 3.6.                          [facility] 

                                                                                             

19:45 Order name: Lipase; Complete Time: 20:41                                                [facility] 

                                                                                             

20:42 Interpretation: Within normal limits: LIP 78.                                           [facility] 

                                                                                             

20:20 Order name: Urine Dipstick-Ancillary; Complete Time: 20:41                              [facility]

                                                                                             

20:42 Interpretation: Normal except: UKET Trace; UPROT 2+; UPH 8.0; UESTR Trace.              [facility] 

                                                                                             

19:45 Order name: IV Saline Lock; Complete Time: 19:58                                        [facility] 

                                                                                             

19:45 Order name: Labs collected and sent; Complete Time: 20:04                               [facility] 

                                                                                             

21:45 Order name: CT Head Brain wo Cont                                                       [facility] 

                                                                                             

22:01 Order name: SARS-COV-2 RT PCR                                                           [facility]

                                                                                             

20:06 Order name: Urine Dipstick-Ancillary (obtain specimen); Complete Time: 20:20            [facility] 

                                                                                                  

Administered Medications:                                                                         

20:08 CANCELLED (Physician Discretion): Zofran (Ondansetron) 4 mg IVP once; over 2 minutes    [facility] 

20:20 Drug: NS 0.9% 1000 ml Route: IV; Rate: 1 bolus; Site: left antecubital;                 8 

21:45 Follow up: IV Status: Completed infusion                                                8 

20:20 Drug: Phenergan (promethazine) 25 mg Route: IVP; Site: left antecubital;                jm8 

21:45 Follow up: Response: No adverse reaction; Nausea is decreased                           jm8 

21:05 Drug: TORadol (ketorolac) 30 mg Route: IVP; Site: left antecubital;                     jm8 

23:38 Follow up: Response: No adverse reaction; Pain is decreased                             [facility] 

                                                                                                  

                                                                                                  

Disposition:                                                                                      

21 22:54 Discharged to Home. Impression: Other viral enteritis.                             

- Condition is Stable.                                                                            

- Discharge Instructions: Food Choices to Help Relieve Diarrhea, Adult, Viral                     

  Gastroenteritis, Adult.                                                                         

- Prescriptions for Bentyl 20 mg Oral Tablet - take 1 tablet by ORAL route every 6                

  hours As needed; 20 tablet. promethazine 25 mg Oral Tablet - take 1 tablet by ORAL              

  route every 6 hours As needed; 20 tablet.                                                       

- Medication Reconciliation Form, Thank You Letter, Antibiotic Education, Prescription            

  Opioid Use, Work release form form.                                                             

- Follow up: Private Physician; When: Upon discharge from the Emergency Department;               

  Reason: Recheck today's complaints, Continuance of care, Re-evaluation by your                  

  physician.                                                                                      

- Problem is new.                                                                                 

- Symptoms have improved.                                                                         

                                                                                                  

                                                                                                  

                                                                                                  

Signatures:                                                                                       

Dispatcher [employer]                           [facility]                                                 

Caio Aguirre MD MD   tw4                                                  

Otoniel Manning, RN                       RN   ll1                                                  

Tato Pagan RN                     RN   jm8                                                  

                                                                                                  

Corrections: (The following items were deleted from the chart)                                    

20:08 19:45 Zofran (Ondansetron) 4 mg IVP once; over 2 minutes ordered. tw4                   8 

21:13 20:06 CORONAVIRUS+MR.LAB.BRZ ordered. [facility]

23:37 22:54 2021 22:54 Discharged to Home. Impression: Other viral enteritis. Condition jm8 

      is Stable. Forms are Medication Reconciliation Form, Thank You Letter, Antibiotic           

      Education, Prescription Opioid Use. Follow up: Private Physician; When: Upon discharge      

      from the Emergency Department; Reason: Recheck today's complaints, Continuance of care,     

      Re-evaluation by your physician. Problem is new. Symptoms have improved. tw4                

                                                                                                  

**************************************************************************************************

## 2021-05-31 NOTE — ER
Nurse's Notes                                                                                     

 [facility]                                                                 

Name: Sid Coffey                                                                                

Age: 56 yrs                                                                                       

Sex: Female                                                                                       

: 1965                                                                                   

MRN: [id number]                                                                                   

Arrival Date: 2021                                                                          

Time: 18:31                                                                                       

Account#: [id number]                                                                            

Bed 16                                                                                            

Private MD:                                                                                       

Diagnosis: Other viral enteritis                                                                  

                                                                                                  

Presentation:                                                                                     

                                                                                             

18:49 Chief complaint: Patient states: Fever, HA, N/V for 2 days. Fever 100.9 at home. No     ll1 

      appetite. Coronavirus screen: Client denies travel out of the [place] in the last 14 days.     

      chills, fever, headache, muscle pain, nausea, shaking with chills, vomiting. Client         

      presents with at least one sign or symptom that may indicate coronavirus-19.                

      Standard/surgical mask placed on the client. Ebola Screen: Patient denies travel to an      

      Ebola-affected area in the 21 days before illness onset. Initial Sepsis Screen: Does        

      the patient meet any 2 criteria? HR > 90 bpm. No. Patient's initial sepsis screen is        

      negative. Does the patient have a suspected source of infection? Yes: Acute abdominal       

      pain. Risk Assessment: Do you want to hurt yourself or someone else? Patient reports no     

      desire to harm self or others. Onset of symptoms was May 30, 2021.                          

18:49 Method Of Arrival: Ambulatory                                                           ll1 

18:49 Acuity: ZULEIKA 3                                                                           ll1 

                                                                                                  

Triage Assessment:                                                                                

20:23 Headache History: Denies prior headaches.                                               jm8 

                                                                                                  

Historical:                                                                                       

- Allergies:                                                                                      

18:49 Zofran;                                                                                 ll1 

- PMHx:                                                                                           

18:49 Asthma; Hypertension;                                                                   ll1 

- PSHx:                                                                                           

18:49 ; Cholecystectomy;                                                             ll1 

                                                                                                  

- Immunization history:: Flu vaccine is up to date.                                               

- Social history:: Smoking status: Patient denies any tobacco usage or history of.                

                                                                                                  

                                                                                                  

Screenin:23 Abuse screen: Denies threats or abuse. Denies injuries from another. Nutritional        jm8 

      screening: No deficits noted. Tuberculosis screening: No symptoms or risk factors           

      identified. Fall Risk IV access (20 points).                                                

                                                                                                  

Assessment:                                                                                       

20:21 General: Appears in no apparent distress. uncomfortable, Behavior is calm, cooperative, jm8 

      appropriate for age. Pain: Complains of pain in head Pain currently is 10 out of 10 on      

      a pain scale. Quality of pain is described as pulsating, Pain began 1 day ago. Also         

      complains of nausea. Neuro: No deficits noted. Level of Consciousness is awake, alert,      

      obeys commands, Oriented to person, place, time. Cardiovascular: No deficits noted.         

      Respiratory: Reports fever Airway is patent Trachea midline Respiratory effort is even,     

      unlabored, Respiratory pattern is regular, symmetrical. GI: No deficits noted. No signs     

      and/or symptoms were reported involving the gastrointestinal system. : No deficits        

      noted. No signs and/or symptoms were reported regarding the genitourinary system. EENT:     

      No deficits noted. No signs and/or symptoms were reported regarding the EENT system.        

      Derm: No deficits noted. No signs and/or symptoms reported regarding the dermatologic       

      system. Musculoskeletal: No deficits noted. No signs and/or symptoms reported regarding     

      the musculoskeletal system.                                                                 

                                                                                                  

Vital Signs:                                                                                      

18:49  / 89; Pulse 98; Resp 18; Temp 100.8; Pulse Ox 95% ; Weight 79.38 kg; Height 5    ll1 

      ft. 2 in. (157.48 cm); Pain 10/10;                                                          

21:46  / 108; Pulse 87; Resp 16; Temp 98.8; Pulse Ox 96% on R/A;                        jm8 

23:36  / 89; Pulse 84; Resp 16; Pulse Ox 99% on R/A;                                    jm8 

18:49 Body Mass Index 32.01 (79.38 kg, 157.48 cm)                                             ll1 

                                                                                                  

Crumpler Coma Score:                                                                               

23:31 Eye Response: spontaneous(4). Verbal Response: oriented(5). Motor Response: obeys       tw4 

      commands(6). Total: 15.                                                                     

                                                                                                  

ED Course:                                                                                        

18:31 Patient arrived in ED.                                                                  ds1 

18:48 Arm band placed on.                                                                     ll1 

18:52 Triage completed.                                                                       ll1 

19:43 Caoi Aguirre MD is Attending Physician.                                            tw4 

20:22 Patient has correct armband on for positive identification. Bed in low position. Call   jm8 

      light in reach. Side rails up X2. Adult w/ patient.                                         

22:09 CT Head Brain wo Cont In Process Unspecified.                                           EDMS

23:37 No provider procedures requiring assistance completed. IV discontinued, intact.         jm8 

                                                                                                  

Administered Medications:                                                                         

20:08 CANCELLED (Physician Discretion): Zofran (Ondansetron) 4 mg IVP once; over 2 minutes    jm8 

20:20 Drug: NS 0.9% 1000 ml Route: IV; Rate: 1 bolus; Site: left antecubital;                 jm8 

21:45 Follow up: IV Status: Completed infusion                                                jm8 

20:20 Drug: Phenergan (promethazine) 25 mg Route: IVP; Site: left antecubital;                jm8 

21:45 Follow up: Response: No adverse reaction; Nausea is decreased                           jm8 

21:05 Drug: TORadol (ketorolac) 30 mg Route: IVP; Site: left antecubital;                     jm8 

23:38 Follow up: Response: No adverse reaction; Pain is decreased                             8 

                                                                                                  

                                                                                                  

Outcome:                                                                                          

22:54 Discharge ordered by MD.                                                                beartiz 

23:37 Discharged to home ambulatory, with family.                                             8 

23:37 Condition: good                                                                             

23:37 Discharge instructions given to patient, family, Instructed on discharge instructions,      

      follow up and referral plans. medication usage, Demonstrated understanding of               

      instructions, follow-up care, medications, Prescriptions given X 2.                         

23:37 Patient left the ED.                                                                    jm8 

                                                                                                  

Signatures:                                                                                       

Dispatcher [employer]                           [facility]                                                 

Marianne Da Silva                                ds1                                                  

Caio Aguirre MD MD   tw4                                                  

Otoniel Manning RN                       RN   ll1                                                  

Tato Pagan RN                     RN   jm8                                                  

                                                                                                  

**************************************************************************************************

## 2021-06-01 VITALS — DIASTOLIC BLOOD PRESSURE: 89 MMHG | SYSTOLIC BLOOD PRESSURE: 132 MMHG | OXYGEN SATURATION: 99 %

## 2021-06-01 VITALS — TEMPERATURE: 98.8 F

## 2021-06-01 NOTE — RAD REPORT
EXAM DESCRIPTION:  CT - Head Brain Wo Cont - 6/1/2021 6:39 am

 

CLINICAL HISTORY:  56-year-old female with headache.

 

COMPARISON:  None.

 

TECHNIQUE:  CT brain without contrast. This exam was performed according to our departmental dose opt
imization program which includes use of automated exposure control, adjustment of the mA and/or kV ac
cording to patient size and/or use of iterative reconstruction technique.

 

FINDINGS:  The ventricles, sulci, and cisterns are within normal limits.   The gray-white matter diff
erentiation is preserved.   There is no mass effect, midline shift, intra- or extra-axial fluid colle
ction/acute hemorrhage.   The osseous structures are unremarkable.   The paranasal sinuses and mastoi
d air cells are clear.

 

IMPRESSION:  No acute intracranial abnormalities.

 

Electronically signed by:   Ledy Perdomo MD   5/31/2021 10:17 PM CDT Workstation: [id number]

 

 

 

Due to temporary technical issues with the PACS/Fluency reporting system, reports are being signed by
 the in house radiologists without review as a courtesy to insure prompt reporting. The interpreting 
radiologist is fully responsible for the content of the report.